# Patient Record
Sex: MALE | Race: WHITE
[De-identification: names, ages, dates, MRNs, and addresses within clinical notes are randomized per-mention and may not be internally consistent; named-entity substitution may affect disease eponyms.]

---

## 2021-12-30 ENCOUNTER — HOSPITAL ENCOUNTER (OUTPATIENT)
Dept: HOSPITAL 49 - FAS | Age: 76
Discharge: HOME | End: 2021-12-30
Attending: SURGERY
Payer: MEDICARE

## 2021-12-30 VITALS — WEIGHT: 125.11 LBS | HEIGHT: 74 IN | BODY MASS INDEX: 16.06 KG/M2

## 2021-12-30 DIAGNOSIS — Z88.5: ICD-10-CM

## 2021-12-30 DIAGNOSIS — F17.210: ICD-10-CM

## 2021-12-30 DIAGNOSIS — Z79.899: ICD-10-CM

## 2021-12-30 DIAGNOSIS — K21.9: ICD-10-CM

## 2021-12-30 DIAGNOSIS — K29.50: Primary | ICD-10-CM

## 2021-12-30 LAB
ALBUMIN SERPL-MCNC: 3.6 G/DL (ref 3.4–5)
ALKALINE PHOSHATASE: 115 U/L (ref 46–116)
ALT SERPL-CCNC: 27 U/L (ref 16–63)
AST: 20 U/L (ref 15–37)
BILIRUBIN - TOTAL: 0.9 MG/DL (ref 0.2–1)
BUN SERPL-MCNC: 21 MG/DL (ref 7–18)
BUN/CREAT RATIO (CALC): 24.7 RATIO
CHLORIDE: 100 MMOL/L (ref 98–107)
CO2 (BICARBONATE): 26 MMOL/L (ref 21–32)
CREATININE: 0.85 MG/DL (ref 0.67–1.17)
GLOBULIN (CALCULATION): 3 G/DL
GLUCOSE SERPL-MCNC: 107 MG/DL (ref 74–106)
HCT: 48.6 % (ref 42–52)
HGB BLD-MCNC: 17.4 G/DL (ref 13.2–18)
MCH RBC QN AUTO: 31.9 PG (ref 25–31)
MCHC RBC AUTO-ENTMCNC: 35.8 G/DL (ref 32–36)
MCV: 89.2 FL (ref 78–100)
MPV: 9.7 FL (ref 6–9.5)
PLT: 290 K/UL (ref 150–400)
POTASSIUM: 4 MMOL/L (ref 3.5–5.1)
RBC: 5.45 M/UL (ref 4.7–6)
RDW: 13 % (ref 11.5–14)
TOTAL PROTEIN: 6.6 G/DL (ref 6.4–8.2)
WBC: 13.7 K/UL (ref 4–10.5)

## 2022-02-10 ENCOUNTER — HOSPITAL ENCOUNTER (EMERGENCY)
Dept: HOSPITAL 49 - FER | Age: 77
Discharge: HOME | End: 2022-02-10
Payer: MEDICARE
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
## 2022-02-10 DIAGNOSIS — S32.019A: Primary | ICD-10-CM
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
## 2022-02-10 DIAGNOSIS — F17.210: ICD-10-CM

## 2022-02-27 ENCOUNTER — HOSPITAL ENCOUNTER (INPATIENT)
Dept: HOSPITAL 49 - FER | Age: 77
LOS: 5 days | Discharge: HOME HEALTH | DRG: 871 | End: 2022-03-04
Attending: INTERNAL MEDICINE | Admitting: INTERNAL MEDICINE
Payer: MEDICARE

## 2022-02-27 VITALS — WEIGHT: 132 LBS | HEIGHT: 74.02 IN | BODY MASS INDEX: 16.94 KG/M2

## 2022-02-27 DIAGNOSIS — J18.0: ICD-10-CM

## 2022-02-27 DIAGNOSIS — Z66: ICD-10-CM

## 2022-02-27 DIAGNOSIS — D52.9: ICD-10-CM

## 2022-02-27 DIAGNOSIS — J96.01: ICD-10-CM

## 2022-02-27 DIAGNOSIS — A41.89: Primary | ICD-10-CM

## 2022-02-27 DIAGNOSIS — Z91.81: ICD-10-CM

## 2022-02-27 DIAGNOSIS — K56.41: ICD-10-CM

## 2022-02-27 DIAGNOSIS — C38.3: ICD-10-CM

## 2022-02-27 DIAGNOSIS — I26.99: ICD-10-CM

## 2022-02-27 DIAGNOSIS — Z88.6: ICD-10-CM

## 2022-02-27 DIAGNOSIS — M80.0AXA: ICD-10-CM

## 2022-02-27 DIAGNOSIS — J12.82: ICD-10-CM

## 2022-02-27 DIAGNOSIS — R65.20: ICD-10-CM

## 2022-02-27 DIAGNOSIS — E44.0: ICD-10-CM

## 2022-02-27 DIAGNOSIS — U07.1: ICD-10-CM

## 2022-02-27 LAB
ALBUMIN SERPL-MCNC: 2.8 G/DL (ref 3.4–5)
ALKALINE PHOSHATASE: 269 U/L (ref 46–116)
ALT SERPL-CCNC: 32 U/L (ref 16–63)
AST: 40 U/L (ref 15–37)
BASOPHIL: 0.2 % (ref 0–2)
BILIRUBIN - TOTAL: 0.5 MG/DL (ref 0.2–1)
BILIRUBIN: NEGATIVE MG/DL
BLOOD: NEGATIVE ERY/UL
BUN SERPL-MCNC: 19 MG/DL (ref 7–18)
BUN/CREAT RATIO (CALC): 20.2 RATIO
CHLORIDE: 100 MMOL/L (ref 98–107)
CLARITY UR: CLEAR
CO2 (BICARBONATE): 21 MMOL/L (ref 21–32)
COLOR: YELLOW
CORONAVIRUS 2019 SARS-COV-2: POSITIVE
CREATININE: 0.94 MG/DL (ref 0.67–1.17)
D DIMER PPP FEU-MCNC: 11.54 UG/MLFEU (ref 0–0.41)
EOSINOPHIL: 0 % (ref 0–7)
GLOBULIN (CALCULATION): 3.2 G/DL
GLUCOSE (U): NORMAL MG/DL
GLUCOSE SERPL-MCNC: 105 MG/DL (ref 74–106)
HCT: 42.8 % (ref 42–52)
HGB BLD-MCNC: 14.3 G/DL (ref 13.2–18)
INFLUENZA A NAA: NEGATIVE
INR PPP: 0.96 (ref 0.9–1.2)
LACTIC ACID: 5.7 MMOL/L (ref 0.4–1.9)
LEUKOCYTES: NEGATIVE LEU/UL
LYMPHOCYTE(M): 6 % (ref 15–48)
LYMPHOCYTE: 3.7 % (ref 15–48)
MCH RBC QN AUTO: 30.8 PG (ref 25–31)
MCHC RBC AUTO-ENTMCNC: 33.4 G/DL (ref 32–36)
MCV: 92.2 FL (ref 78–100)
METAMYELOCYTE: 1
MONOCYTE(M): 4 % (ref 0–12)
MONOCYTE: 2.9 % (ref 0–12)
MPV: 9.1 FL (ref 6–9.5)
NEUTROPHIL: 92.2 % (ref 41–80)
NEUTROPHILS(M): 78 % (ref 41–80)
NEUTS BAND NFR BLD: 10 % (ref 0–10)
NITRITE: NEGATIVE MG/DL
NRBC: 0
PLATELET ESTIMATE: NORMAL
PLATELET MORPHOLOGY: NORMAL
PLT: 413 K/UL (ref 150–400)
POTASSIUM: 4.1 MMOL/L (ref 3.5–5.1)
PROTEIN: NEGATIVE MG/DL
PROTHROMBIN TIME: 12.2 SECONDS (ref 11.8–13.4)
PTT: 33.5 SECONDS (ref 24.4–34.7)
RBC MORPHOLOGY: NORMAL
RBC: 4.64 M/UL (ref 4.7–6)
RDW: 13.2 % (ref 11.5–14)
SPECIFIC GRAVITY: 1.01 (ref 1–1.03)
TOTAL CELL COUNT: 100
TOTAL PROTEIN: 6 G/DL (ref 6.4–8.2)
UROBILINOGEN: 0.2 MG/DL (ref 0.2–1)
WBC: 20.3 K/UL (ref 4–10.5)

## 2022-02-27 PROCEDURE — 3E0333Z INTRODUCTION OF ANTI-INFLAMMATORY INTO PERIPHERAL VEIN, PERCUTANEOUS APPROACH: ICD-10-PCS | Performed by: INTERNAL MEDICINE

## 2022-02-27 PROCEDURE — U0002 COVID-19 LAB TEST NON-CDC: HCPCS

## 2022-02-27 PROCEDURE — C9399 UNCLASSIFIED DRUGS OR BIOLOG: HCPCS

## 2022-02-27 PROCEDURE — 8E0ZXY6 ISOLATION: ICD-10-PCS | Performed by: INTERNAL MEDICINE

## 2022-02-27 PROCEDURE — 3E03329 INTRODUCTION OF OTHER ANTI-INFECTIVE INTO PERIPHERAL VEIN, PERCUTANEOUS APPROACH: ICD-10-PCS | Performed by: INTERNAL MEDICINE

## 2022-02-28 LAB
ALBUMIN SERPL-MCNC: 2.1 G/DL (ref 3.4–5)
ALKALINE PHOSHATASE: 192 U/L (ref 46–116)
ALT SERPL-CCNC: 15 U/L (ref 16–63)
AST: 33 U/L (ref 15–37)
BASOPHIL: 0.1 % (ref 0–2)
BILIRUBIN - TOTAL: 0.4 MG/DL (ref 0.2–1)
BUN SERPL-MCNC: 20 MG/DL (ref 7–18)
BUN/CREAT RATIO (CALC): 30.8 RATIO
C-REACTIVE PROTEIN: 11.5 MG/DL (ref ?–0.9)
CHLORIDE: 100 MMOL/L (ref 98–107)
CO2 (BICARBONATE): 20 MMOL/L (ref 21–32)
CREATININE: 0.65 MG/DL (ref 0.67–1.17)
EOSINOPHIL: 0 % (ref 0–7)
GLOBULIN (CALCULATION): 2.9 G/DL
GLUCOSE SERPL-MCNC: 117 MG/DL (ref 74–106)
HCT: 35.9 % (ref 42–52)
HGB BLD-MCNC: 11.8 G/DL (ref 13.2–18)
LYMPHOCYTE: 7.8 % (ref 15–48)
MAGNESIUM SERPL-MCNC: 1.9 MG/DL (ref 1.8–2.4)
MCH RBC QN AUTO: 30.6 PG (ref 25–31)
MCHC RBC AUTO-ENTMCNC: 32.9 G/DL (ref 32–36)
MCV: 93.2 FL (ref 78–100)
MONOCYTE: 1.9 % (ref 0–12)
MPV: 9 FL (ref 6–9.5)
NEUTROPHIL: 88.2 % (ref 41–80)
NRBC: 0
PLT: 299 K/UL (ref 150–400)
POTASSIUM: 3.4 MMOL/L (ref 3.5–5.1)
RBC MORPHOLOGY: NORMAL
RBC: 3.85 M/UL (ref 4.7–6)
RDW: 13.3 % (ref 11.5–14)
TOTAL PROTEIN: 5 G/DL (ref 6.4–8.2)
WBC: 21.8 K/UL (ref 4–10.5)

## 2022-02-28 PROCEDURE — XW033E5 INTRODUCTION OF REMDESIVIR ANTI-INFECTIVE INTO PERIPHERAL VEIN, PERCUTANEOUS APPROACH, NEW TECHNOLOGY GROUP 5: ICD-10-PCS | Performed by: INTERNAL MEDICINE

## 2022-03-01 LAB
BASOPHIL: 0.1 % (ref 0–2)
BUN SERPL-MCNC: 22 MG/DL (ref 7–18)
BUN/CREAT RATIO (CALC): 30.1 RATIO
C-REACTIVE PROTEIN: 6.1 MG/DL (ref ?–0.9)
CHLORIDE: 103 MMOL/L (ref 98–107)
CO2 (BICARBONATE): 20 MMOL/L (ref 21–32)
CREATININE: 0.73 MG/DL (ref 0.67–1.17)
EOSINOPHIL: 0 % (ref 0–7)
FOLIC ACID (SERUM): 4.8 NG/ML (ref 8.6–58.9)
GLUCOSE SERPL-MCNC: 121 MG/DL (ref 74–106)
HCT: 37.1 % (ref 42–52)
HGB BLD-MCNC: 12.7 G/DL (ref 13.2–18)
IRON % SATURATION: 30.5 %SAT (ref 20–50)
IRON SERPL-MCNC: 39 UG/DL (ref 65–175)
LYMPHOCYTE: 7.5 % (ref 15–48)
MAGNESIUM SERPL-MCNC: 2 MG/DL (ref 1.8–2.4)
MCH RBC QN AUTO: 30.3 PG (ref 25–31)
MCHC RBC AUTO-ENTMCNC: 34.2 G/DL (ref 32–36)
MCV: 88.5 FL (ref 78–100)
MONOCYTE: 4.1 % (ref 0–12)
MPV: 9.4 FL (ref 6–9.5)
NEUTROPHIL: 87.5 % (ref 41–80)
NRBC: 0
PHOSPHORUS: 2.1 MG/DL (ref 2.6–4.7)
PLT: 367 K/UL (ref 150–400)
POTASSIUM: 3.9 MMOL/L (ref 3.5–5.1)
RBC MORPHOLOGY: NORMAL
RBC: 4.19 M/UL (ref 4.7–6)
RDW: 13.2 % (ref 11.5–14)
RETICULOCYTE COUNT: 0.8 % (ref 1–2)
VITAMIN B12: 1920 PG/ML (ref 193–986)
WBC: 23.2 K/UL (ref 4–10.5)

## 2022-03-02 NOTE — NUR
3/2/22 Mr. Oden could not be reached by telephone.
Per telephone conversation with nephew, Eduardo Boyd: Mr. Oden lives
at home with his spouse. His spouse is bedbound. 2 neighbors and and Mr. Oden' brother assist with care. - Mr. Oden uses a cane. Mr. Boyd said a
walker could not be manuvered in the home due to clutter. - Mr. Boyd
chose [facility] for 02 needs and [facility] for HH. A referral was made to [facility] via
[facility]. [facility] was requested to include a .

## 2022-03-03 LAB
BASOPHIL: 0.1 % (ref 0–2)
BUN SERPL-MCNC: 15 MG/DL (ref 7–18)
BUN/CREAT RATIO (CALC): 23.8 RATIO
CHLORIDE: 105 MMOL/L (ref 98–107)
CO2 (BICARBONATE): 22 MMOL/L (ref 21–32)
CREATININE: 0.63 MG/DL (ref 0.67–1.17)
EOSINOPHIL: 0 % (ref 0–7)
GLUCOSE SERPL-MCNC: 94 MG/DL (ref 74–106)
HCT: 34.8 % (ref 42–52)
HGB BLD-MCNC: 12 G/DL (ref 13.2–18)
LYMPHOCYTE: 7.3 % (ref 15–48)
MCH RBC QN AUTO: 30.4 PG (ref 25–31)
MCHC RBC AUTO-ENTMCNC: 34.5 G/DL (ref 32–36)
MCV: 88.1 FL (ref 78–100)
MONOCYTE: 4.9 % (ref 0–12)
MPV: 9.3 FL (ref 6–9.5)
NEUTROPHIL: 86.9 % (ref 41–80)
NRBC: 0
PLT: 319 K/UL (ref 150–400)
POTASSIUM: 3.3 MMOL/L (ref 3.5–5.1)
RBC MORPHOLOGY: NORMAL
RBC: 3.95 M/UL (ref 4.7–6)
RDW: 13.2 % (ref 11.5–14)
WBC: 18.7 K/UL (ref 4–10.5)

## 2022-03-03 NOTE — NUR
3/3 Xerelto co-pay = $482.16 because $466. went to deductible. The further
cost is estimated at $15.00. svetlana Ford was informed of the cost
via voicemail.

## 2022-03-04 NOTE — NUR
PASSED THIS ALONG IN REPORT & TO THE CNA'S. PT MUST, MUST BE PUT ON
TOILET/BSC. PT IS IMPACTED, WE HAVE BEEN TREATING (MEDS, ENEMAS, ETC.) WITH
LITTLE SUCCESS. LOOSE STOOL IS MOVING AROUND IMPACTION. PT HAS BEGUN TO BLEED
RECTALLY. ALSO, PT DECLINING COGNITIVELY
; HE ASKED THIS MORNING IF THERE IS ANY WAY HE CAN
SEE HIS FAMILY. THIS NURSE ASKED DAYSHIFT TO LOOK INTO GETTING PT SWABBED
AGAIN TO SEE IF STILL COVID+. AS PT NEEDS FAMILIAL INTERACTION (ANY
INTERACTION ON REG BASIS) TO PREVENT FURTHER COGNITIVE DECLINE.

## 2022-03-04 NOTE — NUR
PHARM: IT LOOKS AS THOUGH I NEGLECTED TO SCAN PTS 2 NARCS FROM EARLIER IN
SHIFT. I FELT CERTAIN I SCANNED THEM THEREFORE I DO NOT WANT TO RECHART &
CAUSE ISSUE. LET ME KNOW IF THERE IS SOMETHING MORE I NEED TO DO. I APOLOGIZE
FOR ANY PROBLEMS.